# Patient Record
Sex: MALE | Race: WHITE | NOT HISPANIC OR LATINO | Employment: FULL TIME | URBAN - METROPOLITAN AREA
[De-identification: names, ages, dates, MRNs, and addresses within clinical notes are randomized per-mention and may not be internally consistent; named-entity substitution may affect disease eponyms.]

---

## 2022-11-02 ENCOUNTER — OFFICE VISIT (OUTPATIENT)
Dept: MEDICAL GROUP | Facility: PHYSICIAN GROUP | Age: 27
End: 2022-11-02
Payer: COMMERCIAL

## 2022-11-02 VITALS
OXYGEN SATURATION: 97 % | HEIGHT: 74 IN | DIASTOLIC BLOOD PRESSURE: 72 MMHG | SYSTOLIC BLOOD PRESSURE: 126 MMHG | TEMPERATURE: 98.8 F | WEIGHT: 194 LBS | BODY MASS INDEX: 24.9 KG/M2 | RESPIRATION RATE: 18 BRPM | HEART RATE: 50 BPM

## 2022-11-02 DIAGNOSIS — F17.290 PIPE SMOKER: ICD-10-CM

## 2022-11-02 DIAGNOSIS — Z23 NEED FOR VACCINATION: ICD-10-CM

## 2022-11-02 DIAGNOSIS — Z76.89 ENCOUNTER TO ESTABLISH CARE WITH NEW DOCTOR: ICD-10-CM

## 2022-11-02 DIAGNOSIS — Z00.00 WELL ADULT EXAM: ICD-10-CM

## 2022-11-02 PROCEDURE — 99406 BEHAV CHNG SMOKING 3-10 MIN: CPT | Mod: 25 | Performed by: INTERNAL MEDICINE

## 2022-11-02 PROCEDURE — 99385 PREV VISIT NEW AGE 18-39: CPT | Mod: 25 | Performed by: INTERNAL MEDICINE

## 2022-11-02 PROCEDURE — 90686 IIV4 VACC NO PRSV 0.5 ML IM: CPT | Performed by: INTERNAL MEDICINE

## 2022-11-02 PROCEDURE — 90471 IMMUNIZATION ADMIN: CPT | Performed by: INTERNAL MEDICINE

## 2022-11-02 ASSESSMENT — PATIENT HEALTH QUESTIONNAIRE - PHQ9: CLINICAL INTERPRETATION OF PHQ2 SCORE: 0

## 2022-11-02 NOTE — ASSESSMENT & PLAN NOTE
Patient present today to establish care with new primary care provider    Patient feels well without any specific complaint.    Patient request flu shot today.    Family history  Father with heart disease.  Details unclear  Mother no medical issues reported    Social history the patient is  he smokes pipes.  Strongly advised the patient is important to quit  Patient is .  He does not drink alcohol.  Denies other drug use    Surgical history negative

## 2022-11-02 NOTE — PROGRESS NOTES
"PRIMARY CARE CLINIC VISIT    Chief complaint:    New patient /establish care  Request flu shot  Requests lab test    History of Present Illness         Encounter to establish care with new doctor  Patient present today to establish care with new primary care provider    Patient feels well without any specific complaint.    Patient request flu shot today.    Family history  Father with heart disease.  Details unclear  Mother no medical issues reported    Social history the patient is  he smokes pipes.  Strongly advised the patient is important to quit  Patient is .  He does not drink alcohol.  Denies other drug use    Surgical history negative      Pipe smoker  I spent 5 minutes of face to face counseling pt regarding tobacco cessation.   Discussed w pt the potential risks/hazards of tobacco.    Strongly advised pt to quit.      No current outpatient medications on file prior to visit.     No current facility-administered medications on file prior to visit.        Allergies: Patient has no known allergies.    ROS  As per HPI above. All other systems reviewed and negative.      Past Medical, Social, and Family history reviewed and updated in EPIC     Objective     /72 (BP Location: Left arm, Patient Position: Sitting, BP Cuff Size: Adult)   Pulse (!) 50   Temp 37.1 °C (98.8 °F) (Temporal)   Resp 18   Ht 1.88 m (6' 2\")   Wt 88 kg (194 lb)   SpO2 97%    Body mass index is 24.91 kg/m².    General: alert in no apparent distress.  Cardiovascular: regular rate and rhythm  Pulmonary: lungs : no wheezing   Gastrointestinal: BS present. No obvious mass noted  Neuro nonfocal cranial nerves II to XII grossly intact        Assessment and Plan     1. Encounter to establish care with new doctor    2. Need for vaccination  - INFLUENZA VACCINE QUAD INJ (PF)    3. Pipe smoker  Strongly advised the patient is important to quit    4. Well adult exam  - Basic Metabolic Panel; Future  - CBC WITHOUT DIFFERENTIAL; " Future  - Lipid Profile; Future  - TSH; Future  - MICROALBUMIN CREAT RATIO URINE; Future  - VITAMIN D,25 HYDROXY (DEFICIENCY); Future  - HEMOGLOBIN A1C; Future    Routine lab work ordered.  Advised the patient to follow-up few days after lab test done.                  Healthcare Maintenance         Discussed with the patient regarding chickenpox vaccine and Tdap.  The patient will check records and let us know    Recommend patient to go to local pharmacy to get COVID booster vaccine      Please note that this dictation was created using voice recognition software. I have made every reasonable attempt to correct obvious errors, but I expect that there are errors of grammar and possibly content that I did not discover before finalizing the note.    Selvin Zaidi MD  Internal Medicine  [facility]

## 2023-02-10 ENCOUNTER — HOSPITAL ENCOUNTER (OUTPATIENT)
Dept: LAB | Facility: MEDICAL CENTER | Age: 28
End: 2023-02-10
Attending: INTERNAL MEDICINE
Payer: COMMERCIAL

## 2023-02-10 DIAGNOSIS — Z00.00 WELL ADULT EXAM: ICD-10-CM

## 2023-02-10 LAB
25(OH)D3 SERPL-MCNC: 28 NG/ML (ref 30–100)
ANION GAP SERPL CALC-SCNC: 11 MMOL/L (ref 7–16)
BUN SERPL-MCNC: 11 MG/DL (ref 8–22)
CALCIUM SERPL-MCNC: 9.8 MG/DL (ref 8.5–10.5)
CHLORIDE SERPL-SCNC: 105 MMOL/L (ref 96–112)
CHOLEST SERPL-MCNC: 148 MG/DL (ref 100–199)
CO2 SERPL-SCNC: 26 MMOL/L (ref 20–33)
CREAT SERPL-MCNC: 0.86 MG/DL (ref 0.5–1.4)
CREAT UR-MCNC: 43.47 MG/DL
ERYTHROCYTE [DISTWIDTH] IN BLOOD BY AUTOMATED COUNT: 42.4 FL (ref 35.9–50)
GFR SERPLBLD CREATININE-BSD FMLA CKD-EPI: 121 ML/MIN/1.73 M 2
GLUCOSE SERPL-MCNC: 80 MG/DL (ref 65–99)
HCT VFR BLD AUTO: 48.1 % (ref 42–52)
HDLC SERPL-MCNC: 64 MG/DL
HGB BLD-MCNC: 16.3 G/DL (ref 14–18)
LDLC SERPL CALC-MCNC: 77 MG/DL
MCH RBC QN AUTO: 29.6 PG (ref 27–33)
MCHC RBC AUTO-ENTMCNC: 33.9 G/DL (ref 33.7–35.3)
MCV RBC AUTO: 87.3 FL (ref 81.4–97.8)
MICROALBUMIN UR-MCNC: <1.2 MG/DL
MICROALBUMIN/CREAT UR: NORMAL MG/G (ref 0–30)
PLATELET # BLD AUTO: 278 K/UL (ref 164–446)
PMV BLD AUTO: 9.3 FL (ref 9–12.9)
POTASSIUM SERPL-SCNC: 4.3 MMOL/L (ref 3.6–5.5)
RBC # BLD AUTO: 5.51 M/UL (ref 4.7–6.1)
SODIUM SERPL-SCNC: 142 MMOL/L (ref 135–145)
TRIGL SERPL-MCNC: 37 MG/DL (ref 0–149)
TSH SERPL DL<=0.005 MIU/L-ACNC: 1.82 UIU/ML (ref 0.38–5.33)
WBC # BLD AUTO: 5.9 K/UL (ref 4.8–10.8)

## 2023-02-10 PROCEDURE — 80061 LIPID PANEL: CPT

## 2023-02-10 PROCEDURE — 80048 BASIC METABOLIC PNL TOTAL CA: CPT

## 2023-02-10 PROCEDURE — 82306 VITAMIN D 25 HYDROXY: CPT

## 2023-02-10 PROCEDURE — 85027 COMPLETE CBC AUTOMATED: CPT

## 2023-02-10 PROCEDURE — 82570 ASSAY OF URINE CREATININE: CPT

## 2023-02-10 PROCEDURE — 84443 ASSAY THYROID STIM HORMONE: CPT

## 2023-02-10 PROCEDURE — 83036 HEMOGLOBIN GLYCOSYLATED A1C: CPT

## 2023-02-10 PROCEDURE — 36415 COLL VENOUS BLD VENIPUNCTURE: CPT

## 2023-02-10 PROCEDURE — 82043 UR ALBUMIN QUANTITATIVE: CPT

## 2023-02-13 ENCOUNTER — TELEPHONE (OUTPATIENT)
Dept: HEALTH INFORMATION MANAGEMENT | Facility: OTHER | Age: 28
End: 2023-02-13
Payer: COMMERCIAL

## 2023-02-13 NOTE — TELEPHONE ENCOUNTER
Phone Number Called: [phone] (home)    Call outcome:  Spoke to patient regarding message below.    Message: Called to inform patient of lab results and recommendations. Patient verbalized understanding.

## 2023-02-13 NOTE — TELEPHONE ENCOUNTER
----- Message from Selvin Zaidi M.D. sent at 2/11/2023 10:34 AM PST -----  Pls notify pt. Labs back    Vitamin d level : low.   Please consider taking vitamin D3 4000 units daily.  This can be purchased over the counter without a prescription.     Anemia test, chemistry panel, kidney test, urine microalbumin, thyroid test: normal.

## 2023-02-16 ENCOUNTER — NON-PROVIDER VISIT (OUTPATIENT)
Dept: MEDICAL GROUP | Facility: PHYSICIAN GROUP | Age: 28
End: 2023-02-16
Payer: COMMERCIAL

## 2023-02-16 ENCOUNTER — TELEPHONE (OUTPATIENT)
Dept: MEDICAL GROUP | Facility: PHYSICIAN GROUP | Age: 28
End: 2023-02-16

## 2023-02-16 ENCOUNTER — HOSPITAL ENCOUNTER (OUTPATIENT)
Dept: LAB | Facility: MEDICAL CENTER | Age: 28
End: 2023-02-16
Attending: INTERNAL MEDICINE
Payer: COMMERCIAL

## 2023-02-16 DIAGNOSIS — Z23 NEED FOR VACCINATION: ICD-10-CM

## 2023-02-16 LAB
EST. AVERAGE GLUCOSE BLD GHB EST-MCNC: 105 MG/DL
HBA1C MFR BLD: 5.3 % (ref 4–5.6)

## 2023-02-16 PROCEDURE — 83036 HEMOGLOBIN GLYCOSYLATED A1C: CPT

## 2023-02-16 PROCEDURE — 90715 TDAP VACCINE 7 YRS/> IM: CPT | Performed by: NURSE PRACTITIONER

## 2023-02-16 PROCEDURE — 99999 PR NO CHARGE: CPT | Performed by: NURSE PRACTITIONER

## 2023-02-16 PROCEDURE — 90471 IMMUNIZATION ADMIN: CPT | Performed by: NURSE PRACTITIONER

## 2023-02-16 NOTE — PROGRESS NOTES
"Long Sotelo is a 27 y.o. male here for a non-provider visit for:   TDAP    Reason for immunization: Overdue/Provider Recommended  Immunization records indicate need for vaccine: Yes, confirmed with Epic  Minimum interval has been met for this vaccine: Yes  ABN completed: Yes    VIS Dated  2/16/23 was given to patient: Yes  All IAC Questionnaire questions were answered \"No.\"    Patient tolerated injection and no adverse effects were observed or reported: Yes    Pt scheduled for next dose in series: Not Indicated   "

## 2023-05-25 ENCOUNTER — APPOINTMENT (OUTPATIENT)
Dept: ADMISSIONS | Facility: MEDICAL CENTER | Age: 28
End: 2023-05-25
Attending: STUDENT IN AN ORGANIZED HEALTH CARE EDUCATION/TRAINING PROGRAM
Payer: OTHER MISCELLANEOUS

## 2023-05-26 ENCOUNTER — PRE-ADMISSION TESTING (OUTPATIENT)
Dept: ADMISSIONS | Facility: MEDICAL CENTER | Age: 28
End: 2023-05-26
Attending: STUDENT IN AN ORGANIZED HEALTH CARE EDUCATION/TRAINING PROGRAM
Payer: OTHER MISCELLANEOUS

## 2023-05-26 VITALS — BODY MASS INDEX: 24.91 KG/M2 | HEIGHT: 74 IN

## 2023-05-26 RX ORDER — OXYCODONE HYDROCHLORIDE AND ACETAMINOPHEN 5; 325 MG/1; MG/1
TABLET ORAL
Status: ON HOLD | COMMUNITY
Start: 2023-04-27 | End: 2023-05-30

## 2023-05-26 RX ORDER — IBUPROFEN 200 MG
400-600 TABLET ORAL EVERY 6 HOURS
COMMUNITY

## 2023-05-26 NOTE — PREPROCEDURE INSTRUCTIONS
Pre-admit appointment completed.  Pt instructed to continue regularly prescribed medications through the day before surgery. Pt instructed to take the following medications the day of surgery with a sip of water, per anesthesia protocol;  if needed-percocet.

## 2023-05-30 ENCOUNTER — HOSPITAL ENCOUNTER (OUTPATIENT)
Facility: MEDICAL CENTER | Age: 28
End: 2023-05-30
Attending: STUDENT IN AN ORGANIZED HEALTH CARE EDUCATION/TRAINING PROGRAM | Admitting: STUDENT IN AN ORGANIZED HEALTH CARE EDUCATION/TRAINING PROGRAM
Payer: OTHER MISCELLANEOUS

## 2023-05-30 ENCOUNTER — APPOINTMENT (OUTPATIENT)
Dept: RADIOLOGY | Facility: MEDICAL CENTER | Age: 28
End: 2023-05-30
Attending: STUDENT IN AN ORGANIZED HEALTH CARE EDUCATION/TRAINING PROGRAM
Payer: OTHER MISCELLANEOUS

## 2023-05-30 ENCOUNTER — ANESTHESIA EVENT (OUTPATIENT)
Dept: SURGERY | Facility: MEDICAL CENTER | Age: 28
End: 2023-05-30
Payer: OTHER MISCELLANEOUS

## 2023-05-30 ENCOUNTER — ANESTHESIA (OUTPATIENT)
Dept: SURGERY | Facility: MEDICAL CENTER | Age: 28
End: 2023-05-30
Payer: OTHER MISCELLANEOUS

## 2023-05-30 VITALS
SYSTOLIC BLOOD PRESSURE: 131 MMHG | TEMPERATURE: 97 F | HEIGHT: 74 IN | RESPIRATION RATE: 16 BRPM | DIASTOLIC BLOOD PRESSURE: 73 MMHG | OXYGEN SATURATION: 96 % | WEIGHT: 189.6 LBS | HEART RATE: 62 BPM | BODY MASS INDEX: 24.33 KG/M2

## 2023-05-30 PROBLEM — M79.671 RIGHT FOOT PAIN: Status: ACTIVE | Noted: 2023-05-26

## 2023-05-30 PROCEDURE — 700101 HCHG RX REV CODE 250: Performed by: ANESTHESIOLOGY

## 2023-05-30 PROCEDURE — C1713 ANCHOR/SCREW BN/BN,TIS/BN: HCPCS | Performed by: STUDENT IN AN ORGANIZED HEALTH CARE EDUCATION/TRAINING PROGRAM

## 2023-05-30 PROCEDURE — 160029 HCHG SURGERY MINUTES - 1ST 30 MINS LEVEL 4: Performed by: STUDENT IN AN ORGANIZED HEALTH CARE EDUCATION/TRAINING PROGRAM

## 2023-05-30 PROCEDURE — 700105 HCHG RX REV CODE 258: Performed by: STUDENT IN AN ORGANIZED HEALTH CARE EDUCATION/TRAINING PROGRAM

## 2023-05-30 PROCEDURE — 160046 HCHG PACU - 1ST 60 MINS PHASE II: Performed by: STUDENT IN AN ORGANIZED HEALTH CARE EDUCATION/TRAINING PROGRAM

## 2023-05-30 PROCEDURE — 160009 HCHG ANES TIME/MIN: Performed by: STUDENT IN AN ORGANIZED HEALTH CARE EDUCATION/TRAINING PROGRAM

## 2023-05-30 PROCEDURE — 160048 HCHG OR STATISTICAL LEVEL 1-5: Performed by: STUDENT IN AN ORGANIZED HEALTH CARE EDUCATION/TRAINING PROGRAM

## 2023-05-30 PROCEDURE — 700111 HCHG RX REV CODE 636 W/ 250 OVERRIDE (IP): Performed by: ANESTHESIOLOGY

## 2023-05-30 PROCEDURE — 64445 NJX AA&/STRD SCIATIC NRV IMG: CPT | Performed by: STUDENT IN AN ORGANIZED HEALTH CARE EDUCATION/TRAINING PROGRAM

## 2023-05-30 PROCEDURE — 502240 HCHG MISC OR SUPPLY RC 0272: Performed by: STUDENT IN AN ORGANIZED HEALTH CARE EDUCATION/TRAINING PROGRAM

## 2023-05-30 PROCEDURE — 160035 HCHG PACU - 1ST 60 MINS PHASE I: Performed by: STUDENT IN AN ORGANIZED HEALTH CARE EDUCATION/TRAINING PROGRAM

## 2023-05-30 PROCEDURE — 64445 NJX AA&/STRD SCIATIC NRV IMG: CPT | Mod: 59,RT | Performed by: ANESTHESIOLOGY

## 2023-05-30 PROCEDURE — 01480 ANES OPEN PX LOWER L/A/F NOS: CPT | Performed by: ANESTHESIOLOGY

## 2023-05-30 PROCEDURE — 160002 HCHG RECOVERY MINUTES (STAT): Performed by: STUDENT IN AN ORGANIZED HEALTH CARE EDUCATION/TRAINING PROGRAM

## 2023-05-30 PROCEDURE — 160025 RECOVERY II MINUTES (STATS): Performed by: STUDENT IN AN ORGANIZED HEALTH CARE EDUCATION/TRAINING PROGRAM

## 2023-05-30 PROCEDURE — 160041 HCHG SURGERY MINUTES - EA ADDL 1 MIN LEVEL 4: Performed by: STUDENT IN AN ORGANIZED HEALTH CARE EDUCATION/TRAINING PROGRAM

## 2023-05-30 PROCEDURE — 502000 HCHG MISC OR IMPLANTS RC 0278: Performed by: STUDENT IN AN ORGANIZED HEALTH CARE EDUCATION/TRAINING PROGRAM

## 2023-05-30 DEVICE — IMPLANTABLE DEVICE: Type: IMPLANTABLE DEVICE | Site: FOOT | Status: FUNCTIONAL

## 2023-05-30 RX ORDER — HYDROMORPHONE HYDROCHLORIDE 1 MG/ML
0.2 INJECTION, SOLUTION INTRAMUSCULAR; INTRAVENOUS; SUBCUTANEOUS
Status: DISCONTINUED | OUTPATIENT
Start: 2023-05-30 | End: 2023-05-30 | Stop reason: HOSPADM

## 2023-05-30 RX ORDER — MIDAZOLAM HYDROCHLORIDE 1 MG/ML
1 INJECTION INTRAMUSCULAR; INTRAVENOUS
Status: DISCONTINUED | OUTPATIENT
Start: 2023-05-30 | End: 2023-05-30 | Stop reason: HOSPADM

## 2023-05-30 RX ORDER — CEFAZOLIN SODIUM 1 G/3ML
INJECTION, POWDER, FOR SOLUTION INTRAMUSCULAR; INTRAVENOUS PRN
Status: DISCONTINUED | OUTPATIENT
Start: 2023-05-30 | End: 2023-05-30 | Stop reason: SURG

## 2023-05-30 RX ORDER — KETOROLAC TROMETHAMINE 30 MG/ML
INJECTION, SOLUTION INTRAMUSCULAR; INTRAVENOUS PRN
Status: DISCONTINUED | OUTPATIENT
Start: 2023-05-30 | End: 2023-05-30 | Stop reason: SURG

## 2023-05-30 RX ORDER — HYDROMORPHONE HYDROCHLORIDE 1 MG/ML
0.1 INJECTION, SOLUTION INTRAMUSCULAR; INTRAVENOUS; SUBCUTANEOUS
Status: DISCONTINUED | OUTPATIENT
Start: 2023-05-30 | End: 2023-05-30 | Stop reason: HOSPADM

## 2023-05-30 RX ORDER — SODIUM CHLORIDE, SODIUM LACTATE, POTASSIUM CHLORIDE, CALCIUM CHLORIDE 600; 310; 30; 20 MG/100ML; MG/100ML; MG/100ML; MG/100ML
INJECTION, SOLUTION INTRAVENOUS CONTINUOUS
Status: ACTIVE | OUTPATIENT
Start: 2023-05-30 | End: 2023-05-30

## 2023-05-30 RX ORDER — MEPERIDINE HYDROCHLORIDE 25 MG/ML
12.5 INJECTION INTRAMUSCULAR; INTRAVENOUS; SUBCUTANEOUS
Status: DISCONTINUED | OUTPATIENT
Start: 2023-05-30 | End: 2023-05-30 | Stop reason: HOSPADM

## 2023-05-30 RX ORDER — MIDAZOLAM HYDROCHLORIDE 1 MG/ML
INJECTION INTRAMUSCULAR; INTRAVENOUS PRN
Status: DISCONTINUED | OUTPATIENT
Start: 2023-05-30 | End: 2023-05-30 | Stop reason: SURG

## 2023-05-30 RX ORDER — OXYCODONE HCL 5 MG/5 ML
5 SOLUTION, ORAL ORAL
Status: DISCONTINUED | OUTPATIENT
Start: 2023-05-30 | End: 2023-05-30 | Stop reason: HOSPADM

## 2023-05-30 RX ORDER — HYDROMORPHONE HYDROCHLORIDE 1 MG/ML
0.4 INJECTION, SOLUTION INTRAMUSCULAR; INTRAVENOUS; SUBCUTANEOUS
Status: DISCONTINUED | OUTPATIENT
Start: 2023-05-30 | End: 2023-05-30 | Stop reason: HOSPADM

## 2023-05-30 RX ORDER — DIPHENHYDRAMINE HYDROCHLORIDE 50 MG/ML
12.5 INJECTION INTRAMUSCULAR; INTRAVENOUS
Status: DISCONTINUED | OUTPATIENT
Start: 2023-05-30 | End: 2023-05-30 | Stop reason: HOSPADM

## 2023-05-30 RX ORDER — LABETALOL HYDROCHLORIDE 5 MG/ML
5 INJECTION, SOLUTION INTRAVENOUS
Status: DISCONTINUED | OUTPATIENT
Start: 2023-05-30 | End: 2023-05-30 | Stop reason: HOSPADM

## 2023-05-30 RX ORDER — OXYCODONE HCL 5 MG/5 ML
10 SOLUTION, ORAL ORAL
Status: DISCONTINUED | OUTPATIENT
Start: 2023-05-30 | End: 2023-05-30 | Stop reason: HOSPADM

## 2023-05-30 RX ORDER — LIDOCAINE HYDROCHLORIDE 20 MG/ML
INJECTION, SOLUTION EPIDURAL; INFILTRATION; INTRACAUDAL; PERINEURAL PRN
Status: DISCONTINUED | OUTPATIENT
Start: 2023-05-30 | End: 2023-05-30 | Stop reason: SURG

## 2023-05-30 RX ORDER — BUPIVACAINE HYDROCHLORIDE AND EPINEPHRINE 5; 5 MG/ML; UG/ML
INJECTION, SOLUTION EPIDURAL; INTRACAUDAL; PERINEURAL
Status: COMPLETED | OUTPATIENT
Start: 2023-05-30 | End: 2023-05-30

## 2023-05-30 RX ORDER — DEXAMETHASONE SODIUM PHOSPHATE 4 MG/ML
INJECTION, SOLUTION INTRA-ARTICULAR; INTRALESIONAL; INTRAMUSCULAR; INTRAVENOUS; SOFT TISSUE
Status: COMPLETED | OUTPATIENT
Start: 2023-05-30 | End: 2023-05-30

## 2023-05-30 RX ORDER — IPRATROPIUM BROMIDE AND ALBUTEROL SULFATE 2.5; .5 MG/3ML; MG/3ML
3 SOLUTION RESPIRATORY (INHALATION)
Status: DISCONTINUED | OUTPATIENT
Start: 2023-05-30 | End: 2023-05-30 | Stop reason: HOSPADM

## 2023-05-30 RX ORDER — HYDRALAZINE HYDROCHLORIDE 20 MG/ML
5 INJECTION INTRAMUSCULAR; INTRAVENOUS
Status: DISCONTINUED | OUTPATIENT
Start: 2023-05-30 | End: 2023-05-30 | Stop reason: HOSPADM

## 2023-05-30 RX ORDER — HALOPERIDOL 5 MG/ML
1 INJECTION INTRAMUSCULAR
Status: DISCONTINUED | OUTPATIENT
Start: 2023-05-30 | End: 2023-05-30 | Stop reason: HOSPADM

## 2023-05-30 RX ORDER — EPHEDRINE SULFATE 50 MG/ML
5 INJECTION, SOLUTION INTRAVENOUS
Status: DISCONTINUED | OUTPATIENT
Start: 2023-05-30 | End: 2023-05-30 | Stop reason: HOSPADM

## 2023-05-30 RX ORDER — SODIUM CHLORIDE, SODIUM LACTATE, POTASSIUM CHLORIDE, CALCIUM CHLORIDE 600; 310; 30; 20 MG/100ML; MG/100ML; MG/100ML; MG/100ML
INJECTION, SOLUTION INTRAVENOUS CONTINUOUS
Status: DISCONTINUED | OUTPATIENT
Start: 2023-05-30 | End: 2023-05-30 | Stop reason: HOSPADM

## 2023-05-30 RX ORDER — ONDANSETRON 2 MG/ML
4 INJECTION INTRAMUSCULAR; INTRAVENOUS
Status: DISCONTINUED | OUTPATIENT
Start: 2023-05-30 | End: 2023-05-30 | Stop reason: HOSPADM

## 2023-05-30 RX ADMIN — PROPOFOL 200 MCG/KG/MIN: 10 INJECTION, EMULSION INTRAVENOUS at 14:18

## 2023-05-30 RX ADMIN — SODIUM CHLORIDE, POTASSIUM CHLORIDE, SODIUM LACTATE AND CALCIUM CHLORIDE: 600; 310; 30; 20 INJECTION, SOLUTION INTRAVENOUS at 15:41

## 2023-05-30 RX ADMIN — CEFAZOLIN 2 G: 1 INJECTION, POWDER, FOR SOLUTION INTRAMUSCULAR; INTRAVENOUS at 14:11

## 2023-05-30 RX ADMIN — MIDAZOLAM 2 MG: 1 INJECTION, SOLUTION INTRAMUSCULAR; INTRAVENOUS at 14:00

## 2023-05-30 RX ADMIN — DEXAMETHASONE SODIUM PHOSPHATE 4 MG: 4 INJECTION INTRA-ARTICULAR; INTRALESIONAL; INTRAMUSCULAR; INTRAVENOUS; SOFT TISSUE at 14:00

## 2023-05-30 RX ADMIN — LIDOCAINE HYDROCHLORIDE 50 MG: 20 INJECTION, SOLUTION EPIDURAL; INFILTRATION; INTRACAUDAL at 14:11

## 2023-05-30 RX ADMIN — KETOROLAC TROMETHAMINE 30 MG: 30 INJECTION, SOLUTION INTRAMUSCULAR; INTRAVENOUS at 16:10

## 2023-05-30 RX ADMIN — SODIUM CHLORIDE, POTASSIUM CHLORIDE, SODIUM LACTATE AND CALCIUM CHLORIDE: 600; 310; 30; 20 INJECTION, SOLUTION INTRAVENOUS at 14:09

## 2023-05-30 RX ADMIN — BUPIVACAINE HYDROCHLORIDE AND EPINEPHRINE 20 ML: 5; 5 INJECTION, SOLUTION EPIDURAL; INTRACAUDAL; PERINEURAL at 14:00

## 2023-05-30 RX ADMIN — FENTANYL CITRATE 100 MCG: 50 INJECTION, SOLUTION INTRAMUSCULAR; INTRAVENOUS at 14:00

## 2023-05-30 RX ADMIN — FENTANYL CITRATE 100 MCG: 50 INJECTION, SOLUTION INTRAMUSCULAR; INTRAVENOUS at 14:11

## 2023-05-30 RX ADMIN — PROPOFOL 200 MG: 10 INJECTION, EMULSION INTRAVENOUS at 14:11

## 2023-05-30 ASSESSMENT — PAIN DESCRIPTION - PAIN TYPE: TYPE: SURGICAL PAIN

## 2023-05-30 NOTE — ANESTHESIA TIME REPORT
Anesthesia Start and Stop Event Times     Date Time Event    5/30/2023 1352 Ready for Procedure     1409 Anesthesia Start     1620 Anesthesia Stop        Responsible Staff  05/30/23    Name Role Begin End    Kimani Vasquez M.D. Anesth 1409 1620        Overtime Reason:  no overtime (within assigned shift)    Comments:

## 2023-05-30 NOTE — ANESTHESIA PROCEDURE NOTES
Peripheral Block    Date/Time: 5/30/2023 2:00 PM    Performed by: Kimani Vasquez M.D.  Authorized by: Kimani Vasquez M.D.    Patient Location:  Pre-op  Start Time:  5/30/2023 2:00 PM  End Time:  5/30/2023 2:06 PM  Reason for Block: at surgeon's request and post-op pain management ONLY    patient identified, IV checked, site marked, risks and benefits discussed, surgical consent, monitors and equipment checked, pre-op evaluation and timeout performed    Patient Position:  Left lateral decubitus  Prep: ChloraPrep    Monitoring:  Heart rate, continuous pulse ox and cardiac monitor  Block Region:  Lower Extremity  Lower Extremity - Block Type:  SCIATIC nerve block, lateral approach    Laterality:  Right  Procedures: ultrasound guided  Image captured, interpreted and electronically stored.  Local Infiltration:  Lidocaine  Strength:  0.5 %  Dose:  5 ml  Block Type:  Single-shot  Needle Length:  100mm  Needle Gauge:  21 G  Needle Localization:  Ultrasound guidance  Injection Assessment:  Negative aspiration for heme, no paresthesia on injection, incremental injection and local visualized surrounding nerve on ultrasound  Evidence of intravascular injection: No     Bupi diluted to 30cc with LR

## 2023-05-30 NOTE — OP REPORT
Op Note    Name: Long Sotelo    MRN: [id number]    CSN: [id number]    DOS: 5/30/2023    27 y.o.male    Pre-op Diagnosis: Right LisFranc Fracture Dislocation, 2nd, 3rd, and 4th Metatarsal Fractures    Post-op Diagnosis: Same, 4th metatarsal fracture is healing well.    Procedure: Right 2nd and 3rd TMTJ Arthrodesis    Surgeon: Shama Rodríguez D.P.M.    Assistant: None    Anesthesiologist/Type of Anesthesia:  Anesthesiologist: Kimani Vasquez M.D./General    Estimated Blood Loss: less than 50 ml    Specimens: None    Materials:Arthrex 2.4 plates, 2.4 locking and nonlocking screws, DBM, Vicryl, Stratafix    Hemostasis: Thigh 250mmHg    Findings: 3rd metatarsal base fracture is comminuted and extending into the 3rd TMTJ with complete obliteration of the 3rd metatarsal base cartilage. 4th metatarsal fracture is healed well.     Complications:None    Disposition: PACU - hemodynamically stable.    Operative Procedure:  Patient received IV antibiotics preoperatively and under mild sedation, was brought into the operating room and placed in the supine position on the operative bed.   Following IV sedation, a tourniquet was placed around the patient's right thigh.  A surgical time-out was taken in which the patient's name, surgical site and procedure were confirmed and verified. The right  foot and ankle were scrubbed, prepped, and draped in the usual aseptic manner.   An Esmarch bandage was utilized to exsanguinate the extremity, and the tourniquet was then inflated.     Attention was directed to the right foot. Under fluoroscopic guidance we marked out our incisions to the dorsal interval between the 2nd and 3rd ray.  Skin incisions were made carried down layer by layer fashion to the level of the TMTJ 2/3 and TMT 4/5. The 2nd TMT was identified and the joint surfaces were then prepped using curettes and rongeur.  Once there was adequate debridement of all joint surfaces there is flushed with copious amounts  normal saline. The subchondral bone was drilled with a 2.0 drill bit to fenestrate the subchondral bone.  Under fluoroscopic the 2nd TMT was reduced under fluoroscopic guidance.  At this point a dorsal plate was then placed from the 2nd metatarsal shaft to the intermediate cuneiform and was found to provide good compression. At this point attention was directed to the 3rd ray. The 3rd metatarsal fracture was freed up and found to violate the 3rd TMTJ, the cartilage was completely nonviable, and thus the plan for a metatatrsal ORIF was forgone. The decision was made to fuse the 3rd TMTJ. The joint surfaces were then prepped using curettes and rongeur. The subchondral bone was drilled with a 2.0 drill bit to fenestrate the subchondral bone.  Under fluoroscopic the 3rd TMT was reduced under fluoroscopic guidance. A dorsal plate was then placed from the 3rd metatarsal shaft to the lateral cuneiform and was found to provide good compression.    The 4th metatarsal was then evaluated further and the fracture at the base was found to be stable and well healed, fixation of the 4th metatarsal was also forgone.     Final images were then taken. Incisions were then once again rinse with copious amounts of normal saline.  The incisions were then closed using Vicryl, Stratafix and then steri strips.    The incisions were then dressed with Xeroform, covered with gauze, soft roll and a CAM boot was placed. The tourniquet was then deflated, and a prompt hyperemic response was noted to all digits of the right foot.     The patient tolerated the procedure and anesthesia well. Patient was then transferred to the recovery room with vital signs stable and vascular status intact to the right lower extremity. Following a period of postoperative monitoring, the patient was discharged home and given instructions to keep dressing clean, dry, and intact. Avoid excessive ambulation over the next several days. Ice and elevate behind the knee  when at rest. Remain nonweightbearing to the right lower extremity until next follow up visit.

## 2023-05-30 NOTE — ANESTHESIA PREPROCEDURE EVALUATION
Case: [id number] Date/Time: 05/30/23 1345    Procedures:       RIGHT FOOT 2ND TARSOMETATARSAL ARTHRODESIS, 3RD AND 4TH METATARSAL OPEN REDUCTION INTERNAL FIXATION      ORIF, TOE    Pre-op diagnosis: CLOSED FRACTURE DISLOCATION OF TARSOMETATARSAL JOINT    Location:  OR 01 / SURGERY [facility]    Surgeons: Shama Rodríguez D.P.M.          Relevant Problems   Other   (positive) Pipe smoker   (positive) Right foot pain       Physical Exam    Airway   Mallampati: II  TM distance: >3 FB  Neck ROM: full       Cardiovascular - normal exam  Rhythm: regular  Rate: normal  (-) murmur     Dental - normal exam           Pulmonary - normal exam  Breath sounds clear to auscultation     Abdominal    Neurological - normal exam                 Anesthesia Plan    ASA 2       Plan - general and peripheral nerve block     Peripheral nerve block will be post-op pain control  Airway plan will be LMA          Induction: intravenous    Postoperative Plan: Postoperative administration of opioids is intended.    Pertinent diagnostic labs and testing reviewed    Informed Consent:    Anesthetic plan and risks discussed with patient.    Use of blood products discussed with: patient whom consented to blood products.

## 2023-05-30 NOTE — DISCHARGE INSTRUCTIONS
Strict Non Weight Bearing to the R LEG  Pain medications, Muscle Relaxer, Antibiotics sent to the pharmacy  Keep dressing clean, dry, and intact      ACTIVITY: Rest and take it easy for the first 24 hours.  A responsible adult is recommended to remain with you during that time.  It is normal to feel sleepy.  We encourage you to not do anything that requires balance, judgment or coordination.    MILD FLU-LIKE SYMPTOMS ARE NORMAL. YOU MAY EXPERIENCE GENERALIZED MUSCLE ACHES, THROAT IRRITATION, HEADACHE AND/OR SOME NAUSEA.    FOR 24 HOURS DO NOT:  Drive, operate machinery or run household appliances.  Drink beer or alcoholic beverages.   Make important decisions or sign legal documents.    DIET: To avoid nausea, slowly advance diet as tolerated, avoiding spicy or greasy foods for the first day.  Add more substantial food to your diet according to your physician's instructions.   INCREASE FLUIDS AND FIBER TO AVOID CONSTIPATION.      FOLLOW-UP APPOINTMENT:  A follow-up appointment should be arranged with your doctor; call to schedule.    You should CALL YOUR PHYSICIAN if you develop:  Fever greater than 101 degrees F.  Pain not relieved by medication, or persistent nausea or vomiting.  Excessive bleeding (blood soaking through dressing) or unexpected drainage from the wound.  Extreme redness or swelling around the incision site, drainage of pus or foul smelling drainage.  Inability to urinate or empty your bladder within 8 hours.  Problems with breathing or chest pain.    You should call 911 if you develop problems with breathing or chest pain.  If you are unable to contact your doctor or surgical center, you should go to the nearest emergency room or urgent care center.  Physician's telephone #: [phone]    If any questions arise, call your doctor.  If your doctor is not available, please feel free to call the Surgical Center at [phone].     A registered nurse may call you a few days after your surgery to see how  you are doing after your procedure.    MEDICATIONS: Resume taking daily medication.  Take prescribed pain medication with food.  If no medication is prescribed, you may take non-aspirin pain medication if needed.  PAIN MEDICATION CAN BE VERY CONSTIPATING.  Take a stool softener or laxative such as senokot, pericolace, or milk of magnesia if needed.    If your physician has prescribed pain medication that includes Acetaminophen (Tylenol), do not take additional Acetaminophen (Tylenol) while taking the prescribed medication.      Peripheral Nerve Block Discharge Instructions from Same Day Surgery and Inpatient :    What to Expect - Lower Extremity  The block may cause you to experience numbness and weakness in your thigh and knee or calf and foot on the same side as your surgery  Numbness, tingling and / or weakness are all normal. For some people, this may be an unpleasant sensation  These issues will be resolved when the local anesthetic wears off   You may experience numbness and tingling in your thigh on the same side as your surgery if the block medicine was injected at your groin area  Numbness will make it difficult to walk  You may have problems with balance and walking so be very careful   Follow your surgeon's direction regarding weight bearing on your surgical limb  Be very careful with your numb limb  Precautions  The numbness may affect your balance  Be careful when walking or moving around  Your leg may be weak: be very careful putting weight on it  If your surgeon did not specify a time, you should not bear weight for 24 hours  Be sure to ask for help when you need it  It is better to have help than to fall and hurt yourself  Prevent Injury  Protect the limb like a baby  Beware of exposing your limb to extreme heat or cold or trauma  The limb may be injured without you noticing because it is numb  Keep the limb elevated whenever possible  Do not sleep on the limb  Change the position of the limb  "regularly  Avoid putting pressure on your surgical limb  Pain Control  The initial block on the day of surgery will make your extremity feel \"numb\"  Any consecutive injection including prior to discharge from the hospital will make your extremity feel \"numb\"  You may feel an aching or burning when the local anesthesia starts to wear off  Take pain pills as prescribed by your surgeon  Call your surgeon or anesthesiologist if you do not have adequate pain control    "

## 2023-05-30 NOTE — ANESTHESIA PROCEDURE NOTES
Airway    Date/Time: 5/30/2023 2:12 PM    Performed by: Kimani Vasquez M.D.  Authorized by: Kimani Vasquez M.D.    Location:  OR  Urgency:  Elective  Difficult Airway: No    Indications for Airway Management:  Anesthesia      Spontaneous Ventilation: absent    Sedation Level:  Deep  Preoxygenated: Yes    Mask Difficulty Assessment:  1 - vent by mask  Final Airway Type:  Supraglottic airway  Final Supraglottic Airway:  Standard LMA    SGA Size:  5  Number of Attempts at Approach:  1

## 2023-05-30 NOTE — OR SURGEON
Brief Op Note    Name: Long Sotelo    MRN: [id number]    CSN: [id number]    DOS: 5/30/2023    27 y.o.male    Pre-op Diagnosis: Right LisFranc Fracture Dislocation, 2nd, 3rd, and 4th Metatarsal Fractures    Post-op Diagnosis: Same, 4th metatarsal fracture is healing well.    Procedure: Right 2nd and 3rd TMTJ Arthrodesis    Surgeon: Shama Rodríguez D.P.M.    Assistant: None    Anesthesiologist/Type of Anesthesia:  Anesthesiologist: Kimani Vasquez M.D./General    Estimated Blood Loss: less than 50 ml    Specimens: None    Materials:Arthrex 2.4 plates, 2.4 locking and nonlocking screws, DBM, Vicryl, Stratafix    Hemostasis: Thigh 250mmHg    Findings: 3rd metatarsal base fracture is comminuted and extending into the 3rd TMTJ with complete obliteration of the 3rd metatarsal base cartilage. 4th metatarsal fracture is healed well.     Complications:None    Disposition: PACU - hemodynamically stable.    Shama Rodríguez DPM  [facility] - Foot & Ankle Surgery  05/30/23  4:22 PM

## 2023-05-30 NOTE — ANESTHESIA POSTPROCEDURE EVALUATION
Patient: Long Sotelo    Procedure Summary     Date: 05/30/23 Room / Location:  OR  / SURGERY [facility]    Anesthesia Start: 1409 Anesthesia Stop: 1620    Procedures:       RIGHT FOOT 2ND TARSOMETATARSAL ARTHRODESIS, 3RD AND 4TH METATARSAL OPEN REDUCTION INTERNAL FIXATION (Right: Foot)      ORIF, TOE (Right: Foot) Diagnosis: (CLOSED FRACTURE DISLOCATION OF TARSOMETATARSAL JOINT)    Surgeons: Shama Rodríguez D.P.M. Responsible Provider: Kimani Vasquez M.D.    Anesthesia Type: general, peripheral nerve block ASA Status: 2          Final Anesthesia Type: general, peripheral nerve block  Last vitals  BP   Blood Pressure: 131/78    Temp   37.2 °C (99 °F)    Pulse   63   Resp   20    SpO2   94 %      Anesthesia Post Evaluation    Patient location during evaluation: PACU  Patient participation: waiting for patient participation  Level of consciousness: obtunded/minimal responses    Airway patency: patent  Anesthetic complications: no  Cardiovascular status: hemodynamically stable  Respiratory status: acceptable and oral airway  Hydration status: euvolemic    PONV: none          No notable events documented.     Nurse Pain Score: 0 (NPRS)

## 2023-05-30 NOTE — OR NURSING
1616:To PACU from OR via gurney,  sleeping, respirations spontaneous and non-labored via LMA. Icepack applied over c/d/i R foot surgical dressings. R toes pink/warm with brisk CRF. R boot insitu from OR. Unable to assess R pedal pulse through dressings,     1628: Rouses spontaneously, suctioned orally of clear secretions when LMA d/rashawn, denies pain        1630: Drowsy    1640: Commenced po water @ pt request.      1700: No change in surgical site assessment. Meets criteria to transfer to Stage 2.

## 2023-05-31 NOTE — H&P
Reviewed the H&P completed on 5/3/23. It requires no update as the patient's condition upon re-examination remains unchanged.    - Educated patient on diagnosis, prognosis, possible treatment modalities, all questions answered.   - Patient seen preoperatively and understands planned Right Foot surgery  - The potential benefits, risks and side effects of the proposed care, treatment and services, as well as those of reasonable alternative treatment options including non-treatment, have been discussed in detail with the patient. I have explained the goals of treatment and the likelihood of achieving these goals, including any potential problems that might occur during recuperation. I have answered all of the patient’s questions regarding both his condition and the proposed treatment. Following this discussion, the patient wishes to proceed with the proposed care, treatment and services. The patient will be provided a Disclosure and Consent document for their review and signature as a complement to this documented informed consent discussion.   - Risks of surgery include but are not limited to: pain, deep or superficial infection, painful or hypertrophic scarring, neurovascular injury, loss of function, delayed wound healing, delayed/mal/non union, arthritis, failure of fixation, painful hardware, bleeding, DVT/PE, need for further surgical intervention, death/mi/cva.    - DVT ppx and s/sx of DVT/PE reviewed, ED precautions understood  - Reviewed post-operative course, pt agrees to be compliant with recommendations  - Consent reviewed and signed and in the chart.  - Side and site verified and initialed.  - History reviewed and no changes noted.  - Reviewed indications for surgery, including Right Foot LisFranc Fracture Dislocation  - Consented for Right 2nd and 3rd TMTJ Arthrodesis, 4th Metatarsal ORIF    Shama Rodríguez DPM

## 2023-05-31 NOTE — OR NURSING
1701: Patient arrived to phase II from PACU 1 via gurney. Report received from RN. Respirations are spontaneous and unlabored. VSS on RA. Dressing is CDI. RLE: cap refill less than 3 seconds, warm.    1722: Family at bedside.     1736: Patient education completed, family denies further questions. DC'd to care of family post uneventful stay in PACU 2.

## 2024-06-20 ENCOUNTER — OFFICE VISIT (OUTPATIENT)
Dept: MEDICAL GROUP | Facility: PHYSICIAN GROUP | Age: 29
End: 2024-06-20
Payer: COMMERCIAL

## 2024-06-20 VITALS
HEART RATE: 64 BPM | WEIGHT: 188 LBS | SYSTOLIC BLOOD PRESSURE: 116 MMHG | RESPIRATION RATE: 16 BRPM | DIASTOLIC BLOOD PRESSURE: 60 MMHG | BODY MASS INDEX: 24.13 KG/M2 | TEMPERATURE: 99 F | OXYGEN SATURATION: 97 % | HEIGHT: 74 IN

## 2024-06-20 DIAGNOSIS — F17.290 PIPE SMOKER: ICD-10-CM

## 2024-06-20 DIAGNOSIS — E55.9 VITAMIN D DEFICIENCY: ICD-10-CM

## 2024-06-20 DIAGNOSIS — M54.50 CHRONIC LEFT-SIDED LOW BACK PAIN WITHOUT SCIATICA: ICD-10-CM

## 2024-06-20 DIAGNOSIS — G89.29 CHRONIC LEFT-SIDED LOW BACK PAIN WITHOUT SCIATICA: ICD-10-CM

## 2024-06-20 DIAGNOSIS — Z00.00 WELL ADULT EXAM: ICD-10-CM

## 2024-06-20 DIAGNOSIS — Z11.4 SCREENING FOR HIV (HUMAN IMMUNODEFICIENCY VIRUS): ICD-10-CM

## 2024-06-20 DIAGNOSIS — K21.9 GASTROESOPHAGEAL REFLUX DISEASE WITHOUT ESOPHAGITIS: ICD-10-CM

## 2024-06-20 DIAGNOSIS — Z11.59 NEED FOR HEPATITIS C SCREENING TEST: ICD-10-CM

## 2024-06-20 PROBLEM — M79.671 RIGHT FOOT PAIN: Status: RESOLVED | Noted: 2023-05-26 | Resolved: 2024-06-20

## 2024-06-20 PROBLEM — R10.9 ABDOMINAL PAIN: Status: ACTIVE | Noted: 2024-06-20

## 2024-06-20 PROBLEM — Z76.89 ENCOUNTER TO ESTABLISH CARE WITH NEW DOCTOR: Status: RESOLVED | Noted: 2022-11-02 | Resolved: 2024-06-20

## 2024-06-20 PROBLEM — M25.552 LEFT HIP PAIN: Status: ACTIVE | Noted: 2024-06-20

## 2024-06-20 PROBLEM — R10.9 ABDOMINAL PAIN: Status: RESOLVED | Noted: 2024-06-20 | Resolved: 2024-06-20

## 2024-06-20 PROCEDURE — 3074F SYST BP LT 130 MM HG: CPT | Performed by: INTERNAL MEDICINE

## 2024-06-20 PROCEDURE — 3078F DIAST BP <80 MM HG: CPT | Performed by: INTERNAL MEDICINE

## 2024-06-20 PROCEDURE — 99214 OFFICE O/P EST MOD 30 MIN: CPT | Performed by: INTERNAL MEDICINE

## 2024-06-20 RX ORDER — PANTOPRAZOLE SODIUM 40 MG/1
40 TABLET, DELAYED RELEASE ORAL DAILY
Qty: 90 TABLET | Refills: 3 | Status: SHIPPED | OUTPATIENT
Start: 2024-06-20

## 2024-06-20 RX ORDER — VITAMIN B COMPLEX
1000 TABLET ORAL DAILY
COMMUNITY

## 2024-06-20 RX ORDER — TIZANIDINE 4 MG/1
4 TABLET ORAL 2 TIMES DAILY PRN
Qty: 60 TABLET | Refills: 1 | Status: SHIPPED | OUTPATIENT
Start: 2024-06-20

## 2024-06-20 ASSESSMENT — PATIENT HEALTH QUESTIONNAIRE - PHQ9: CLINICAL INTERPRETATION OF PHQ2 SCORE: 0

## 2024-06-21 NOTE — PROGRESS NOTES
PRIMARY CARE CLINIC VISIT        Chief Complaint   Patient presents with    Follow-Up      Chronic low back pain  Acid reflux  BMI  Vitamin D deficiency  Pipe smoker  Request lab tests        History of Present Illness     Vitamin D deficiency  Chronic condition.  Patient presently taking vitamin D supplementation.  Lab test requested for follow-up        Pipe smoker  Patient is a pipe smoker.  Strong advised patient to quit smoking    Chronic left-sided low back pain  Chronic cond  Noted on off x 8yrs  Denies recent trauma or injury  Denies bowel or bladder dysfunction  Takes ibuprofen prn  Intensity 2-3/10  No aggravating factors      BMI 24.0-24.9, adult  Chronic , stable  Recommend pt to follow a healthy , well balance diet  Pt to continue with regular exercise activity and to maintain ideal weight.     GERD (gastroesophageal reflux disease)  This is a new condition for symptoms noted since the last several weeks.  The patient reported upper abdominal pain in the epigastric region.  Symptoms seem to get worse after consuming spicy/acidic food.  He also drinks coffee.  Patient has been taking over-the-counter antacid without significant improvement.    Current Outpatient Medications on File Prior to Visit   Medication Sig Dispense Refill    vitamin D3 (CHOLECALCIFEROL) 1000 Unit (25 mcg) Tab Take 1,000 Units by mouth every day.      ibuprofen (MOTRIN) 200 MG Tab Take 400-600 mg by mouth every 6 hours.       No current facility-administered medications on file prior to visit.        Allergies: Patient has no known allergies.    Current Outpatient Medications Ordered in Epic   Medication Sig Dispense Refill    vitamin D3 (CHOLECALCIFEROL) 1000 Unit (25 mcg) Tab Take 1,000 Units by mouth every day.      pantoprazole (PROTONIX) 40 MG Tablet Delayed Response Take 1 Tablet by mouth every day. 90 Tablet 3    ibuprofen (MOTRIN) 200 MG Tab Take 400-600 mg by mouth every 6 hours.       No current Epic-ordered  facility-administered medications on file.       Past Medical History:   Diagnosis Date    Chickenpox     as child    COVID-19     COVID-19 2020    Dental disorder     2 front upper teeth capped    Right foot pain 2023    if touched       Past Surgical History:   Procedure Laterality Date    TOE FUSION Right 2023    Procedure: RIGHT FOOT 2ND TARSOMETATARSAL ARTHRODESIS, 3RD AND 4TH METATARSAL OPEN REDUCTION INTERNAL FIXATION;  Surgeon: Shama Rodríguez D.P.M.;  Location: SURGERY [facility];  Service: Podiatry    ORIF,TOE Right 2023    Procedure: ORIF, TOE;  Surgeon: Shama Rodríguez D.P.M.;  Location: SURGERY [facility];  Service: Podiatry    DENTAL EXTRACTION(S)      wisdom teeth    MANDIBLE REDUCTION CLOSED      as child-wired jaw-no hardware       Family History   Problem Relation Age of Onset    No Known Problems Mother     Heart Disease Father     No Known Problems Brother        Social History     Tobacco Use   Smoking Status Former    Types: Pipe    Quit date:     Years since quittin.4   Smokeless Tobacco Never       Social History     Substance and Sexual Activity   Alcohol Use Yes    Alcohol/week: 0.6 - 1.2 oz    Types: 1 - 2 Standard drinks or equivalent per week       Review of systems  As per HPI above. All other systems reviewed and negative.      Past Medical, Social, and Family history reviewed and updated in [facility]       LAB DATA:    Lab Results   Component Value Date/Time    HBA1C 5.3 2023 09:15 AM       Lab Results   Component Value Date/Time    WBC 5.9 02/10/2023 12:28 PM    HEMOGLOBIN 16.3 02/10/2023 12:28 PM    HEMATOCRIT 48.1 02/10/2023 12:28 PM    MCV 87.3 02/10/2023 12:28 PM    PLATELETCT 278 02/10/2023 12:28 PM       Lab Results   Component Value Date/Time    SODIUM 142 02/10/2023 12:28 PM    POTASSIUM 4.3 02/10/2023 12:28 PM    GLUCOSE 80 02/10/2023 12:28 PM    BUN 11 02/10/2023 12:28 PM    CREATININE 0.86 02/10/2023 12:28 PM       Lab Results  "  Component Value Date/Time    CHOLSTRLTOT 148 02/10/2023 12:28 PM    TRIGLYCERIDE 37 02/10/2023 12:28 PM    HDL 64 02/10/2023 12:28 PM    LDL 77 02/10/2023 12:28 PM       No results found for: \"ALTSGPT\"       Objective     /60 (BP Location: Right arm, Patient Position: Sitting, BP Cuff Size: Large adult)   Pulse 64   Temp 37.2 °C (99 °F) (Temporal)   Resp 16   Ht 1.88 m (6' 2\")   Wt 85.3 kg (188 lb)   SpO2 97%    Body mass index is 24.14 kg/m².    General: alert in no apparent distress.  Cardiovascular: regular rate and rhythm  Pulmonary: lungs : no wheezing   Gastrointestinal: BS present.   Low back: No significant spinal deformity noted.   Pain noted w palpation of the lumbar region.  ROM : flexion, extension, rotation, lateral bend of back limited due to pain  Hip : no significant swelling, redness or deformity.   Pain noted w palpation trochanteric region  ROM limited due to pain noted w hip abduction and external rotation     Assessment and Plan     1. Chronic left-sided low back pain without sciatica  Chronic worsening condition.  Suspect degenerative joint versus disc pathology versus others  - DX-HIP-COMPLETE - UNILATERAL 2+ LEFT; Future  - DX-LUMBAR SPINE-4+ VIEWS; Future  - Referral to Physical Therapy  Patient may take tizanidine as needed for muscle spasm    - Advised pt re: neck/ back care, posture and regular stretching exercises.   Rec to maintain ideal weight.   Ice/heat application as directed.  Avoid heavy lifting or activities which may aggravate pt's condition.   Consider the use of over the counter topical treatment such as  Biofreeze as needed       2. Gastroesophageal reflux disease without esophagitis  This is a new condition.  rule out GERD versus hiatal hernia versus PUD versus others  - DX-UPPER GI-AIR CONTRAST; Future  - pantoprazole (PROTONIX) 40 MG Tablet Delayed Response; Take 1 Tablet by mouth every day.  Dispense: 90 Tablet; Refill: 3  The patient to avoid Pap " smoking.    3. BMI 24.0-24.9, adult  Chronic stable condition but recommend diet and lifestyle modification.  Encouraged patient to maintain ideal weight.    4. Vitamin D deficiency  Chronic condition.  Current status unclear.  Lab test requested for follow-up    5. Pipe smoker  Chronic condition.  Uncontrolled.  Advised patient to quit smoking completely    6. Well adult exam  - Basic Metabolic Panel; Future  - CBC WITHOUT DIFFERENTIAL; Future  - HEMOGLOBIN A1C; Future  - Lipid Profile; Future  - TSH; Future  - MICROALBUMIN CREAT RATIO URINE; Future  - VITAMIN D,25 HYDROXY (DEFICIENCY); Future  I did order routine lab.  Advised the patient to follow-up after a few days.        Attestation: I spent:  33  minutes -    That includes time for chart review before the visit, the actual patient visit, and time spent on documentation in EMR after the visit.  Chart review/prep, review of other providers' records, imaging/lab review, face-to-face time for history/examination, pt's counseling/education, ordering, prescribing,  review of results/meds/ treatment plan with patient, and care coordination.               Please note that this dictation was created using voice recognition software. I have made every reasonable attempt to correct obvious errors, but I expect that there are errors of grammar and possibly content that I did not discover before finalizing the note.    Selvin Zaidi MD  Internal Medicine  [facility]

## 2024-06-21 NOTE — ASSESSMENT & PLAN NOTE
Chronic cond  Noted on off x 8yrs  Denies recent trauma or injury  Denies bowel or bladder dysfunction  Takes ibuprofen prn  Intensity 2-3/10  No aggravating factors

## 2024-06-21 NOTE — ASSESSMENT & PLAN NOTE
This is a new condition for symptoms noted since the last several weeks.  The patient reported upper abdominal pain in the epigastric region.  Symptoms seem to get worse after consuming spicy/acidic food.  He also drinks coffee.  Patient has been taking over-the-counter antacid without significant improvement.

## 2024-07-02 ENCOUNTER — APPOINTMENT (OUTPATIENT)
Dept: PHYSICAL THERAPY | Facility: MEDICAL CENTER | Age: 29
End: 2024-07-02
Attending: INTERNAL MEDICINE
Payer: COMMERCIAL

## 2024-07-11 ENCOUNTER — APPOINTMENT (OUTPATIENT)
Dept: PHYSICAL THERAPY | Facility: MEDICAL CENTER | Age: 29
End: 2024-07-11
Attending: INTERNAL MEDICINE
Payer: COMMERCIAL

## 2024-07-17 ENCOUNTER — APPOINTMENT (OUTPATIENT)
Dept: PHYSICAL THERAPY | Facility: MEDICAL CENTER | Age: 29
End: 2024-07-17
Attending: INTERNAL MEDICINE
Payer: COMMERCIAL

## 2024-07-24 ENCOUNTER — APPOINTMENT (OUTPATIENT)
Dept: PHYSICAL THERAPY | Facility: MEDICAL CENTER | Age: 29
End: 2024-07-24
Attending: INTERNAL MEDICINE
Payer: COMMERCIAL

## 2024-07-29 ENCOUNTER — APPOINTMENT (OUTPATIENT)
Dept: RADIOLOGY | Facility: MEDICAL CENTER | Age: 29
End: 2024-07-29
Attending: INTERNAL MEDICINE
Payer: COMMERCIAL

## 2024-07-31 ENCOUNTER — APPOINTMENT (OUTPATIENT)
Dept: PHYSICAL THERAPY | Facility: MEDICAL CENTER | Age: 29
End: 2024-07-31
Attending: INTERNAL MEDICINE
Payer: COMMERCIAL

## 2024-08-07 ENCOUNTER — APPOINTMENT (OUTPATIENT)
Dept: PHYSICAL THERAPY | Facility: MEDICAL CENTER | Age: 29
End: 2024-08-07
Attending: INTERNAL MEDICINE
Payer: COMMERCIAL

## 2024-08-14 ENCOUNTER — APPOINTMENT (OUTPATIENT)
Dept: PHYSICAL THERAPY | Facility: MEDICAL CENTER | Age: 29
End: 2024-08-14
Attending: INTERNAL MEDICINE
Payer: COMMERCIAL

## 2024-08-28 ENCOUNTER — APPOINTMENT (OUTPATIENT)
Dept: PHYSICAL THERAPY | Facility: MEDICAL CENTER | Age: 29
End: 2024-08-28
Attending: INTERNAL MEDICINE
Payer: COMMERCIAL

## 2024-09-04 ENCOUNTER — APPOINTMENT (OUTPATIENT)
Dept: PHYSICAL THERAPY | Facility: MEDICAL CENTER | Age: 29
End: 2024-09-04
Attending: INTERNAL MEDICINE
Payer: COMMERCIAL

## 2025-01-08 ENCOUNTER — HOSPITAL ENCOUNTER (OUTPATIENT)
Dept: RADIOLOGY | Facility: MEDICAL CENTER | Age: 30
End: 2025-01-08
Attending: INTERNAL MEDICINE
Payer: COMMERCIAL

## 2025-01-08 DIAGNOSIS — G89.29 CHRONIC LEFT-SIDED LOW BACK PAIN WITHOUT SCIATICA: ICD-10-CM

## 2025-01-08 DIAGNOSIS — M54.50 CHRONIC LEFT-SIDED LOW BACK PAIN WITHOUT SCIATICA: ICD-10-CM

## 2025-01-08 PROCEDURE — 73502 X-RAY EXAM HIP UNI 2-3 VIEWS: CPT | Mod: LT

## 2025-01-08 PROCEDURE — 72110 X-RAY EXAM L-2 SPINE 4/>VWS: CPT

## 2025-01-09 DIAGNOSIS — G89.29 CHRONIC LEFT-SIDED LOW BACK PAIN WITHOUT SCIATICA: ICD-10-CM

## 2025-01-09 DIAGNOSIS — M25.552 LEFT HIP PAIN: ICD-10-CM

## 2025-01-09 DIAGNOSIS — M54.50 CHRONIC LEFT-SIDED LOW BACK PAIN WITHOUT SCIATICA: ICD-10-CM

## 2025-02-04 ENCOUNTER — HOSPITAL ENCOUNTER (OUTPATIENT)
Dept: RADIOLOGY | Facility: MEDICAL CENTER | Age: 30
End: 2025-02-04
Attending: INTERNAL MEDICINE
Payer: COMMERCIAL

## 2025-02-04 DIAGNOSIS — K21.9 GASTROESOPHAGEAL REFLUX DISEASE WITHOUT ESOPHAGITIS: ICD-10-CM

## 2025-02-04 PROCEDURE — 74240 X-RAY XM UPR GI TRC 1CNTRST: CPT

## 2025-03-25 ENCOUNTER — OFFICE VISIT (OUTPATIENT)
Dept: MEDICAL GROUP | Facility: PHYSICIAN GROUP | Age: 30
End: 2025-03-25
Payer: COMMERCIAL

## 2025-03-25 VITALS
HEIGHT: 74 IN | BODY MASS INDEX: 24.38 KG/M2 | SYSTOLIC BLOOD PRESSURE: 114 MMHG | OXYGEN SATURATION: 98 % | TEMPERATURE: 98 F | HEART RATE: 58 BPM | RESPIRATION RATE: 16 BRPM | WEIGHT: 190 LBS | DIASTOLIC BLOOD PRESSURE: 70 MMHG

## 2025-03-25 DIAGNOSIS — M54.50 CHRONIC LEFT-SIDED LOW BACK PAIN WITHOUT SCIATICA: ICD-10-CM

## 2025-03-25 DIAGNOSIS — Z11.1 TUBERCULOSIS SCREENING: ICD-10-CM

## 2025-03-25 DIAGNOSIS — E55.9 VITAMIN D DEFICIENCY: Chronic | ICD-10-CM

## 2025-03-25 DIAGNOSIS — G89.29 CHRONIC LEFT-SIDED LOW BACK PAIN WITHOUT SCIATICA: ICD-10-CM

## 2025-03-25 DIAGNOSIS — Z11.59 SCREENING FOR VIRAL DISEASE: ICD-10-CM

## 2025-03-25 DIAGNOSIS — K21.9 GASTROESOPHAGEAL REFLUX DISEASE WITHOUT ESOPHAGITIS: Chronic | ICD-10-CM

## 2025-03-25 PROBLEM — F17.290 PIPE SMOKER: Status: RESOLVED | Noted: 2022-11-02 | Resolved: 2025-03-25

## 2025-03-25 PROBLEM — Z23 NEED FOR VACCINATION: Status: ACTIVE | Noted: 2025-03-25

## 2025-03-25 PROBLEM — Z02.9 ADMINISTRATIVE ENCOUNTER: Status: ACTIVE | Noted: 2025-03-25

## 2025-03-25 PROCEDURE — 3074F SYST BP LT 130 MM HG: CPT | Performed by: INTERNAL MEDICINE

## 2025-03-25 PROCEDURE — 3078F DIAST BP <80 MM HG: CPT | Performed by: INTERNAL MEDICINE

## 2025-03-25 PROCEDURE — 99214 OFFICE O/P EST MOD 30 MIN: CPT | Performed by: INTERNAL MEDICINE

## 2025-03-25 ASSESSMENT — PATIENT HEALTH QUESTIONNAIRE - PHQ9: CLINICAL INTERPRETATION OF PHQ2 SCORE: 0

## 2025-03-25 NOTE — PROGRESS NOTES
PRIMARY CARE CLINIC VISIT        Chief Complaint   Patient presents with    Other     Discuss vaccines-tb/varicella   Patient attending EMT school, requests varicella titer  Request QuantiFERON TB test  Vitamin D deficiency  Acid reflux        History of Present Illness     GERD (gastroesophageal reflux disease)  Chronic condition.  The patient presently taking pantoprazole.  Patient denies nausea vomiting or dysphagia.    Vitamin D deficiency  Chronic condition.  Patient currently taking vitamin D supplementation.  No new symptoms reported.  Recommend lab test to be done for follow-up.    Tuberculosis screening  Patient is attending in EMT school  Patient is requesting QuantiferonTB test to be done  Denies fever chills shortness of breath wheezing night sweats or cough.    Screening for viral disease  Pt Is attending EMT school.  Patient request lab test to check for varicella titer    Chronic left-sided low back pain  Chronic condition.  The patient take tizanidine as needed.  No new symptoms reported.  The patient denies bowel bladder dysfunction.  Denies motor weakness or paresthesia.    Current Outpatient Medications on File Prior to Visit   Medication Sig Dispense Refill    vitamin D3 (CHOLECALCIFEROL) 1000 Unit (25 mcg) Tab Take 1,000 Units by mouth every day.      pantoprazole (PROTONIX) 40 MG Tablet Delayed Response Take 1 Tablet by mouth every day. 90 Tablet 3    tizanidine (ZANAFLEX) 4 MG Tab Take 1 Tablet by mouth 2 times a day as needed (muscle spasm). 60 Tablet 1    ibuprofen (MOTRIN) 200 MG Tab Take 400-600 mg by mouth every 6 hours.       No current facility-administered medications on file prior to visit.        Allergies: Patient has no known allergies.    Current Outpatient Medications Ordered in Epic   Medication Sig Dispense Refill    vitamin D3 (CHOLECALCIFEROL) 1000 Unit (25 mcg) Tab Take 1,000 Units by mouth every day.      pantoprazole (PROTONIX) 40 MG Tablet Delayed Response Take 1 Tablet  by mouth every day. 90 Tablet 3    tizanidine (ZANAFLEX) 4 MG Tab Take 1 Tablet by mouth 2 times a day as needed (muscle spasm). 60 Tablet 1    ibuprofen (MOTRIN) 200 MG Tab Take 400-600 mg by mouth every 6 hours.       No current [facility]-ordered facility-administered medications on file.       Past Medical History:   Diagnosis Date    Chickenpox     as child    COVID-19 2022    COVID-19 12/2020    Dental disorder     2 front upper teeth capped    Right foot pain 05/26/2023    if touched       Past Surgical History:   Procedure Laterality Date    TOE FUSION Right 5/30/2023    Procedure: RIGHT FOOT 2ND TARSOMETATARSAL ARTHRODESIS, 3RD AND 4TH METATARSAL OPEN REDUCTION INTERNAL FIXATION;  Surgeon: VARGAS DarbyPJamaalMJamaal;  Location: SURGERY [facility];  Service: Podiatry    ORIF,TOE Right 5/30/2023    Procedure: ORIF, TOE;  Surgeon: VARGAS DarbyPJamaalMJamaal;  Location: SURGERY [facility];  Service: Podiatry    DENTAL EXTRACTION(S)      wisdom teeth    MANDIBLE REDUCTION CLOSED      as child-wired jaw-no hardware       Family History   Problem Relation Age of Onset    No Known Problems Mother     Heart Disease Father     No Known Problems Brother        Social History     Tobacco Use   Smoking Status Former    Types: Pipe    Quit date: 2022    Years since quitting: 3.2   Smokeless Tobacco Never       Social History     Substance and Sexual Activity   Alcohol Use Yes    Alcohol/week: 0.6 - 1.2 oz    Types: 1 - 2 Standard drinks or equivalent per week       Review of systems  As per HPI above. All other systems reviewed and negative.      Past Medical, Social, and Family history reviewed and updated in [facility]       LAB DATA:     I have independently reviewed / interpreted labs    Lab Results   Component Value Date/Time    HBA1C 5.3 02/16/2023 09:15 AM        Lab Results   Component Value Date/Time    WBC 5.9 02/10/2023 12:28 PM    HEMOGLOBIN 16.3 02/10/2023 12:28 PM    HEMATOCRIT 48.1 02/10/2023 12:28 PM    MCV  "87.3 02/10/2023 12:28 PM    PLATELETCT 278 02/10/2023 12:28 PM       Lab Results   Component Value Date/Time    SODIUM 142 02/10/2023 12:28 PM    POTASSIUM 4.3 02/10/2023 12:28 PM    GLUCOSE 80 02/10/2023 12:28 PM    BUN 11 02/10/2023 12:28 PM    CREATININE 0.86 02/10/2023 12:28 PM       Lab Results   Component Value Date/Time    CHOLSTRLTOT 148 02/10/2023 12:28 PM    TRIGLYCERIDE 37 02/10/2023 12:28 PM    HDL 64 02/10/2023 12:28 PM    LDL 77 02/10/2023 12:28 PM       No results found for: \"ALTSGPT\"       Objective     /70 (BP Location: Right arm, Patient Position: Sitting, BP Cuff Size: Adult)   Pulse (!) 58   Temp 36.7 °C (98 °F) (Temporal)   Resp 16   Ht 1.88 m (6' 2\")   Wt 86.2 kg (190 lb)   SpO2 98%    Body mass index is 24.39 kg/m².    General: alert in no apparent distress.  Cardiovascular: regular rate and rhythm  Pulmonary: lungs : no wheezing   Gastrointestinal: BS present.       Assessment and Plan     1. Gastroesophageal reflux disease without esophagitis  Chronic stable.  Continue pantoprazole 40 Mg daily    2. Vitamin D deficiency  Condition.  Current status unclear.  Lab test ordered to check vitamin D level  - VITAMIN D,25 HYDROXY (DEFICIENCY); Future    3. Tuberculosis screening  Patient asymptomatic.  Lab test requested.  - Quantiferon Gold Plus TB (4 tube); Future    4. Screening for viral disease  Patient reported that he had chickenpox previously.  Varicella titer order for follow-up for confirmation  - VARICELLA ZOSTER IGG AB; Future    5. Chronic left-sided low back pain without sciatica  Stable.  The patient may take tizanidine as needed.                Please note that this dictation was created using voice recognition software. I have made every reasonable attempt to correct obvious errors, but I expect that there are errors of grammar and possibly content that I did not discover before finalizing the note.    Selvin Zaidi MD  Internal Medicine  [facility]  "

## 2025-03-25 NOTE — ASSESSMENT & PLAN NOTE
Chronic condition.  The patient take tizanidine as needed.  No new symptoms reported.  The patient denies bowel bladder dysfunction.  Denies motor weakness or paresthesia.

## 2025-03-25 NOTE — ASSESSMENT & PLAN NOTE
Chronic condition.  The patient presently taking pantoprazole.  Patient denies nausea vomiting or dysphagia.

## 2025-03-25 NOTE — ASSESSMENT & PLAN NOTE
Chronic condition.  Patient currently taking vitamin D supplementation.  No new symptoms reported.  Recommend lab test to be done for follow-up.

## 2025-03-25 NOTE — ASSESSMENT & PLAN NOTE
Patient is attending in [employer] school  Patient is requesting QuantiferonTB test to be done  Denies fever chills shortness of breath wheezing night sweats or cough.

## 2025-03-27 ENCOUNTER — HOSPITAL ENCOUNTER (OUTPATIENT)
Dept: LAB | Facility: MEDICAL CENTER | Age: 30
End: 2025-03-27
Attending: INTERNAL MEDICINE
Payer: COMMERCIAL

## 2025-03-27 DIAGNOSIS — Z11.1 TUBERCULOSIS SCREENING: ICD-10-CM

## 2025-03-27 DIAGNOSIS — Z11.59 SCREENING FOR VIRAL DISEASE: ICD-10-CM

## 2025-03-27 PROCEDURE — 86787 VARICELLA-ZOSTER ANTIBODY: CPT

## 2025-03-27 PROCEDURE — 86480 TB TEST CELL IMMUN MEASURE: CPT

## 2025-03-27 PROCEDURE — 36415 COLL VENOUS BLD VENIPUNCTURE: CPT

## 2025-03-29 LAB
GAMMA INTERFERON BACKGROUND BLD IA-ACNC: 0.03 IU/ML
M TB IFN-G BLD-IMP: NEGATIVE
M TB IFN-G CD4+ BCKGRND COR BLD-ACNC: 0 IU/ML
MITOGEN IGNF BCKGRD COR BLD-ACNC: >10 IU/ML
QFT TB2 - NIL TBQ2: 0 IU/ML
VZV IGG SER IA-ACNC: 3.4 S/CO

## 2025-03-31 ENCOUNTER — RESULTS FOLLOW-UP (OUTPATIENT)
Dept: MEDICAL GROUP | Facility: PHYSICIAN GROUP | Age: 30
End: 2025-03-31
Payer: COMMERCIAL

## (undated) DEVICE — SUTURE 3-0 VICRYL PLUS SH - 27 INCH (36/BX)

## (undated) DEVICE — CLOSURE SKIN STRIP 1/2 X 4 IN - (STERI STRIP) (50/BX 4BX/CA)

## (undated) DEVICE — GLOVE BIOGEL SZ 6 PF LATEX - (50EA/BX 4BX/CA)

## (undated) DEVICE — BANDAGE ELASTIC 4 HONEYCOMB - 4"X5YD LF (20/CA)"

## (undated) DEVICE — LACTATED RINGERS INJ 1000 ML - (14EA/CA 60CA/PF)

## (undated) DEVICE — PADDING CAST 4 IN STERILE - 4 X 4 YDS (24/CA)

## (undated) DEVICE — ELECTRODE DUAL RETURN W/ CORD - (50/PK)

## (undated) DEVICE — SUTURE 3-0 30 CM X 30 CM STRATAFIX SPRIAL PS-1 NEEDLE (12/BX)

## (undated) DEVICE — Device

## (undated) DEVICE — SUTURE 3-0 ETHILON PS-1 (36PK/BX)

## (undated) DEVICE — PAD PREP 24 X 48 CUFFED - (100/CA)

## (undated) DEVICE — PAD LAP STERILE 18 X 18 - (5/PK 40PK/CA)

## (undated) DEVICE — SET LEADWIRE 5 LEAD BEDSIDE DISPOSABLE ECG (1SET OF 5/EA)

## (undated) DEVICE — GOWN WARMING STANDARD FLEX - (30/CA)

## (undated) DEVICE — BLADE SAGITTAL SAW 9.4MM X 25.5MM X .4MM FINE TOOTH (1/EA)

## (undated) DEVICE — DRAPE LARGE 3 QUARTER - (20/CA)

## (undated) DEVICE — BLADE SURGICAL #15 - (50/BX 3BX/CA)

## (undated) DEVICE — SLEEVE VASO CALF MED - (10PR/CA)

## (undated) DEVICE — SODIUM CHL IRRIGATION 0.9% 1000ML (12EA/CA)

## (undated) DEVICE — DRILL BIT

## (undated) DEVICE — GLOVE BIOGEL INDICATOR SZ 6.5 SURGICAL PF LTX - (50PR/BX 4BX/CA)

## (undated) DEVICE — STERI STRIP COMPOUND BENZOIN - TINCTURE 0.6ML WITH APPLICATOR (40EA/BX)

## (undated) DEVICE — WIRE, GUIDE

## (undated) DEVICE — TOWEL STOP TIMEOUT SAFETY FLAG (40EA/CA)

## (undated) DEVICE — PACK LOWER EXTREMITY - (2/CA)